# Patient Record
Sex: MALE | Race: WHITE | NOT HISPANIC OR LATINO | Employment: OTHER | URBAN - METROPOLITAN AREA
[De-identification: names, ages, dates, MRNs, and addresses within clinical notes are randomized per-mention and may not be internally consistent; named-entity substitution may affect disease eponyms.]

---

## 2020-12-15 PROBLEM — J12.82 PNEUMONIA DUE TO COVID-19 VIRUS: Status: ACTIVE | Noted: 2020-12-15

## 2020-12-15 PROBLEM — U07.1 PNEUMONIA DUE TO COVID-19 VIRUS: Status: ACTIVE | Noted: 2020-12-15

## 2020-12-15 PROBLEM — J96.01 ACUTE RESPIRATORY FAILURE WITH HYPOXIA: Status: ACTIVE | Noted: 2020-12-15

## 2020-12-16 PROBLEM — N17.9 AKI (ACUTE KIDNEY INJURY): Status: ACTIVE | Noted: 2020-12-16

## 2020-12-21 PROBLEM — D62 ACUTE BLOOD LOSS ANEMIA: Status: ACTIVE | Noted: 2020-12-21

## 2021-01-13 PROBLEM — N17.9 AKI (ACUTE KIDNEY INJURY): Status: RESOLVED | Noted: 2020-12-16 | Resolved: 2021-01-13

## 2021-01-13 PROBLEM — J12.82 PNEUMONIA DUE TO COVID-19 VIRUS: Status: RESOLVED | Noted: 2020-12-15 | Resolved: 2021-01-13

## 2021-01-13 PROBLEM — J96.01 ACUTE RESPIRATORY FAILURE WITH HYPOXIA: Status: RESOLVED | Noted: 2020-12-15 | Resolved: 2021-01-13

## 2021-01-13 PROBLEM — U07.1 PNEUMONIA DUE TO COVID-19 VIRUS: Status: RESOLVED | Noted: 2020-12-15 | Resolved: 2021-01-13

## 2021-01-13 PROBLEM — D62 ACUTE BLOOD LOSS ANEMIA: Status: RESOLVED | Noted: 2020-12-21 | Resolved: 2021-01-13

## 2024-01-07 PROBLEM — R79.89 ELEVATED BRAIN NATRIURETIC PEPTIDE (BNP) LEVEL: Status: ACTIVE | Noted: 2024-01-07

## 2024-01-07 PROBLEM — E87.29 HIGH ANION GAP METABOLIC ACIDOSIS: Status: ACTIVE | Noted: 2024-01-07

## 2024-01-07 PROBLEM — Z71.89 ACP (ADVANCE CARE PLANNING): Status: ACTIVE | Noted: 2024-01-07

## 2024-01-07 PROBLEM — A41.9 SEVERE SEPSIS: Status: ACTIVE | Noted: 2024-01-07

## 2024-01-07 PROBLEM — R65.20 SEVERE SEPSIS: Status: ACTIVE | Noted: 2024-01-07

## 2024-01-07 PROBLEM — J18.9 BILATERAL PNEUMONIA: Status: ACTIVE | Noted: 2024-01-07

## 2024-01-10 PROBLEM — R78.81 BACTEREMIA: Status: ACTIVE | Noted: 2024-01-10

## 2024-01-10 PROBLEM — E87.29 HIGH ANION GAP METABOLIC ACIDOSIS: Status: RESOLVED | Noted: 2024-01-07 | Resolved: 2024-01-10

## 2024-01-10 PROBLEM — I50.41 ACUTE COMBINED SYSTOLIC AND DIASTOLIC CONGESTIVE HEART FAILURE: Status: ACTIVE | Noted: 2024-01-07

## 2024-01-11 PROBLEM — N41.0 ACUTE PROSTATITIS: Status: ACTIVE | Noted: 2024-01-11

## 2024-01-11 PROBLEM — N13.9 ACUTE BILATERAL OBSTRUCTIVE UROPATHY: Status: ACTIVE | Noted: 2024-01-11

## 2024-01-16 PROBLEM — N17.0 SEPSIS WITH ACUTE RENAL FAILURE, TUBULAR NECROSIS, AND SEPTIC SHOCK: Status: ACTIVE | Noted: 2024-01-16

## 2024-01-16 PROBLEM — A41.9 SEPSIS WITH ACUTE RENAL FAILURE, TUBULAR NECROSIS, AND SEPTIC SHOCK: Status: ACTIVE | Noted: 2024-01-16

## 2024-01-16 PROBLEM — R65.21 SEPSIS WITH ACUTE RENAL FAILURE, TUBULAR NECROSIS, AND SEPTIC SHOCK: Status: ACTIVE | Noted: 2024-01-16

## 2024-02-08 ENCOUNTER — TELEPHONE (OUTPATIENT)
Dept: INFECTIOUS DISEASES | Facility: CLINIC | Age: 79
End: 2024-02-08

## 2024-02-08 ENCOUNTER — OFFICE VISIT (OUTPATIENT)
Dept: INFECTIOUS DISEASES | Facility: CLINIC | Age: 79
End: 2024-02-08
Payer: MEDICARE

## 2024-02-08 VITALS
HEIGHT: 72 IN | DIASTOLIC BLOOD PRESSURE: 85 MMHG | BODY MASS INDEX: 26.25 KG/M2 | SYSTOLIC BLOOD PRESSURE: 144 MMHG | HEART RATE: 90 BPM | WEIGHT: 193.81 LBS

## 2024-02-08 DIAGNOSIS — N18.30 STAGE 3 CHRONIC KIDNEY DISEASE, UNSPECIFIED WHETHER STAGE 3A OR 3B CKD: ICD-10-CM

## 2024-02-08 DIAGNOSIS — N41.0 PROSTATITIS, ACUTE: Primary | ICD-10-CM

## 2024-02-08 DIAGNOSIS — Z79.2 RECEIVING INTRAVENOUS ANTIBIOTIC TREATMENT AS OUTPATIENT: ICD-10-CM

## 2024-02-08 DIAGNOSIS — R78.81 BACTEREMIA: ICD-10-CM

## 2024-02-08 PROCEDURE — 99215 OFFICE O/P EST HI 40 MIN: CPT | Mod: S$PBB,,, | Performed by: PHYSICIAN ASSISTANT

## 2024-02-08 PROCEDURE — 99213 OFFICE O/P EST LOW 20 MIN: CPT | Mod: PBBFAC,PO | Performed by: PHYSICIAN ASSISTANT

## 2024-02-08 PROCEDURE — 99999 PR PBB SHADOW E&M-EST. PATIENT-LVL III: CPT | Mod: PBBFAC,,, | Performed by: PHYSICIAN ASSISTANT

## 2024-02-08 NOTE — TELEPHONE ENCOUNTER
Per Alena Mccauley PA-C  - increase IV Cefepime 2 g every 24 hours to Cefepime 2 g every 12 hours  maintain PICC line  EOC 2/21/24   HH to pull PICC and DC labs after last dose  F/U PRN  Called [facility], spoke with Barry, gave above orders.  Called [facility], left message on on vm of Nurse Lesli BUTLER Of above orders, also sent SC

## 2024-02-08 NOTE — PROGRESS NOTES
[facility]  Infectious Disease        Patient ID: Osmin Hernández is a 78 y.o. male.    Chief Complaint: Prostatitis      Osmin was seen today for prostatitis.    Diagnoses and all orders for this visit:    Prostatitis, acute    Bacteremia    Receiving intravenous antibiotic treatment as outpatient    Stage 3 chronic kidney disease, unspecified whether stage 3a or 3b CKD         Greater than 45 minutes was spent on this encounter, which included: review of recent encounters, review and interpretation of labs/images, obtaining pertinent history, performing a physical examination, counseling and educating the patient/family/caregiver, ordering medications/tests, documenting in the electronic health record, and coordinating care with necessary providers.    Interval HPI:   2/8 - ID clinic f/u. Patient here with his wife. He has several concerns/complaints today regarding his medical care in general. Particularly about his blood pressure medication since hospital discharge. I discussed with him and politely deferred his questions to the appropriate specialists/PCP. He has an appt with Cardiology tomorrow. In regards to his infection, he seems to be doing well. Denies fevers, chills, night sweats. Had harris removed and is now on flomax and denies dysuria, incomplete voiding sensation, decreased UOP, etc. Compliant with IV Cefepime and tolerating without issue (wife does his infusions).    Assessment and Plan:     # Sepsis secondary to Citrobacter bacteremia due to prostatitis  - Prev Urine culture Oct 2023: Citrobacter  - 1/7: Bcx:  Citrobacter   - 1/7: Urine culture:  Multiple organisms.  - 1/8: Bcx:  Negative  - discharged home on Cefepime IV to continue for 4-6 weeks for prostatitis  (6 week EOC 2/21/24)     # Urinary retention due to BPH and prostatitis  - No previous history of prostate issues   - CT renal stone study:  Prostatomegaly with acute cystitis.  - PSA: 15.8 (prev 1.4  four months ago)  - Urology planning further workup as outpatient     # Prolonged QTC  - not good candidate for FQ    # CKD     Recommendations:  - labs reviewed and CBC is normal. CMP with improved kidney function  - followed with Urology and passed voiding trial and had harris removed and is continuing on Flomax with no urinary complaints, per patient  - will complete full prostatitis course  - Continue Cefepime IV, but dose needs to be adjusted to 2g IV q12h to reflect current CrCl 33 based on last labs  - Length of therapy: 6 weeks for prostatitis (6 week EOC 2/21/24)  - continue weekly labs  - after last dose, Home Health can pull PICC line and d/c labs/PICC care  - he should f/u with his Urologist as instructed. Also if he develops new urinary symptoms  - f/u with ID as needed      Majority of this encounter was spent discussing various healthcare concerns and issues with his overall medical experience that were unrelated to his current infection for which we are treating. Questions and concerns were addressed to the best of my ability within my scope, and patient seemed satisfied with our discussion by the end of the encounter.       Alena Mccauley PA-C  Infectious Diseases  [facility]           HPI:      This is an 78-year-old man with previous medical history of CKD will came to the hospital on 01/07/2024 complaining of fevers chills and fatigue.  Patient states that onset of symptoms started 1 day prior to admission.    Denies any other issues.    During initial evaluation in the ED patient was found to be afebrile, tachycardic, hypotensive.  Multiple labs were grossly abnormal, leukocytosis with elevated lactic acidosis.    Patient was found to have urinary retention and Harris was placed.    Blood cultures were obtained were positive for Citrobacter.    Patient was started on ceftriaxone infectious Disease was consulted    Past Medical History:   Diagnosis Date    a A H/O  Medical Noncompliance #####    He Stops Meds And Changes Doses W/O Asking Me, And Refuses Vaccine And His AA U/S Study    a H/O Brief Postoperative Arrhythmia In 10/2013     Dr. Tommie Edwards Evaluated And Reportedly Is Resolved    b Hypertension     b Hypertension With White Coat Component     b Stage 3 CKD ###    b White Coat Syndrome     c Hypercholesterolemia     1/2/19 Changed Livalo To 1 Mg qHS; He Was On Livalo 2 Mg qHS, But He Was Only Taking It Every Other Night !    g Chronic Mild Microcytic Anemia ###    9/27/17 Ordered Lab W/U And FOBT But He Refused To Do This; He Refuses To Ever Have A Screening TC    i H/O COVID-19 Infection     [facility] 12/15/20-12/26/20 Stay For This    j H/O Blood Loss Anemia ###    1/13/21 He Refused My Recommended GI Consult; [facility] 12/15/20-12/26/20 Stay For COVID-19 Infection Included This: He Refused GI Consult Or Further W/U While There; He Received 1 Unit PRBCs While There    j H/O Hematochezia ###    1/13/21 He Refused My Recommended GI Consult; [facility] 12/15/20-12/26/20 Stay For COVID-19 Infection Included This: He Refused GI Consult Or Further W/U While There     j H/O Right Inguinal Hernia Repair In 10/2013     Dr. Randal nielson Nocturia     Markedly Improved With Flomax    l Gouty Arthropathy ###    On Allopurinol 300 Mg Daily    q Borderline Vitamin D Deficiency     1/6/19 RXd OTC D3 2K IU Daily    q Right Forearm Dermatitis     Dr. Tristin Daley's 10/12/16 Punch BX = Perivascular Dermatitis With Eosinophils, And Reactive Epithelial Changes    Sepsis, unspecified organism     Admitted [facility] 1/07/2024 - 1/12/2024    Wellness Visit 8/31/2023        Past Surgical History:   Procedure Laterality Date    ADENOIDECTOMY      APPENDECTOMY      CATARACT EXTRACTION      COLON SURGERY      1 foot colon removal after strangulated right inguinal hernia repair    HERNIA REPAIR      TONSILLECTOMY         Family History   Problem Relation Age of Onset    Heart failure Mother         CHF        Social History     Socioeconomic History    Marital status:    Tobacco Use    Smoking status: Former     Passive exposure: Past    Smokeless tobacco: Never   Substance and Sexual Activity    Alcohol use: No    Drug use: Never     Social Determinants of Health     Financial Resource Strain: Low Risk  (1/9/2024)    Overall Financial Resource Strain (CARDIA)     Difficulty of Paying Living Expenses: Not hard at all   Food Insecurity: No Food Insecurity (1/9/2024)    Hunger Vital Sign     Worried About Running Out of Food in the Last Year: Never true     Ran Out of Food in the Last Year: Never true   Transportation Needs: No Transportation Needs (1/13/2024)    OASIS : Transportation     Lack of Transportation (Medical): No     Lack of Transportation (Non-Medical): No     Patient Unable or Declines to Respond: No   Stress: Patient Declined (1/9/2024)    [employer] [place] [employer] - Occupational Stress Questionnaire     Feeling of Stress : Patient declined   Social Connections: Feeling Socially Integrated (1/13/2024)    OASIS : Social Isolation     Frequency of experiencing loneliness or isolation: Never   Housing Stability: Low Risk  (1/9/2024)    Housing Stability Vital Sign     Unable to Pay for Housing in the Last Year: No     Number of Places Lived in the Last Year: 1     Unstable Housing in the Last Year: No       Review of patient's allergies indicates:   Allergen Reactions    Amlodipine      Bilateral leg rash    Crestor [rosuvastatin]      Muscle cramps    Livalo [pitavastatin] Other (See Comments)     Muscle cramps and nightmares     Pravastatin      Other reaction(s): Myalgias       Current Outpatient Medications   Medication Instructions    allopurinoL (ZYLOPRIM) 300 mg, Oral, Daily PRN    ascorbic acid (vitamin C) (VITAMIN C) 500 mg, Oral, 2 times daily    aspirin 81 mg, Oral, Daily    cholecalciferol (vitamin D3) (VITAMIN D3) 2,000 Units, Oral, Daily, Patient takes 5,000  units    clopidogreL (PLAVIX) 75 mg, Oral, Daily    colchicine (COLCRYS) 0.6 mg, Oral, 2 times daily PRN    dextrose 5 % in water (D5W) PgBk 100 mL with ceFEPIme 2 gram SolR 2 g 2 g, Intravenous, Daily    heparin sod,porcine/0.9 % NaCl (HEPARIN FLUSH IV) 50 Units, Intra-Catheter, Daily, Flush PICC with 50 units/5ml of Heparin following Saline flush and Medication administration per [facility] protocol    metoprolol succinate (TOPROL-XL) 25 mg, Oral, Daily    sodium chloride 0.9% (NORMAL SALINE FLUSH) injection 10 mLs, Intra-Catheter, 2 times daily PRN, Flush PICC with 10ml of NS before and after prescribed Cefepime each day per [facility] protocol    tamsulosin (FLOMAX) 0.4 mg, Oral, Daily    UNABLE TO FIND Compound Drug antibiotic for 6 weeks          Review of Systems   Constitutional:  Negative for activity change, appetite change, chills, fatigue and fever.   HENT:  Negative for congestion, mouth sores, sinus pain and sore throat.    Eyes:  Negative for photophobia and visual disturbance.   Respiratory:  Negative for cough, chest tightness, shortness of breath and wheezing.    Cardiovascular:  Negative for chest pain, palpitations and leg swelling.   Gastrointestinal:  Negative for abdominal pain, diarrhea, nausea and vomiting.   Genitourinary:  Negative for dysuria, flank pain, hematuria and urgency.   Musculoskeletal:  Negative for arthralgias, myalgias, neck pain and neck stiffness.   Skin:  Negative for color change and rash.   Neurological:  Negative for dizziness, seizures and headaches.   Psychiatric/Behavioral:  Negative for confusion.            Objective:     Vitals:    02/08/24 0911   BP: (!) 144/85   Pulse: 90              Physical Exam  Vitals and nursing note reviewed.   Constitutional:       General: He is awake. He is not in acute distress.     Appearance: He is well-developed and well-groomed. He is not diaphoretic.   HENT:      Head: Normocephalic and atraumatic.      Right Ear: External ear normal.       Left Ear: External ear normal.      Nose: Nose normal.   Eyes:      General: No scleral icterus.        Right eye: No discharge.         Left eye: No discharge.      Pupils: Pupils are equal, round, and reactive to light.   Cardiovascular:      Rate and Rhythm: Normal rate and regular rhythm.   Pulmonary:      Effort: Pulmonary effort is normal. No accessory muscle usage or respiratory distress.   Abdominal:      General: There is no distension.   Musculoskeletal:      Cervical back: Normal range of motion and neck supple.   Skin:     General: Skin is warm and dry.   Neurological:      General: No focal deficit present.      Mental Status: He is alert and oriented to person, place, and time.   Psychiatric:         Mood and Affect: Mood normal.         Behavior: Behavior normal.           Estimated Creatinine Clearance: 31.9 mL/min (A) (based on SCr of 2.03 mg/dL (H)).      Microbiology Results (last 7 days)       ** No results found for the last 168 hours. **              Significant Labs: All pertinent labs within the past 24 hours have been reviewed.     Significant Imaging: I have reviewed all relevant and available imaging results/findings within the past 24 hours.      Plan -- see top of note

## 2024-03-13 ENCOUNTER — TELEPHONE (OUTPATIENT)
Dept: NEPHROLOGY | Facility: CLINIC | Age: 79
End: 2024-03-13
Payer: MEDICARE

## 2024-03-13 NOTE — TELEPHONE ENCOUNTER
----- Message from Lizette Rodriguez RN sent at 3/13/2024 11:33 AM CDT -----  Regarding: New Patient Appt  Hi,    I am attempting to get this pt an appt w/ Dr. Hou to establish care. Patient is currently seeing Dr. Hwang (with his next appt on 3/25/24) and wishes to change providers. Would someone be able to assist me with getting this pt an farhat w/ Dr. Hou within the next 2 weeks? Please advise.    Thank you for your time,  Lizette Rodriguez, MELLISSA  Post Acute Navigation

## 2024-03-20 ENCOUNTER — OFFICE VISIT (OUTPATIENT)
Dept: NEPHROLOGY | Facility: CLINIC | Age: 79
End: 2024-03-20
Payer: MEDICARE

## 2024-03-20 VITALS
HEIGHT: 72 IN | OXYGEN SATURATION: 95 % | HEART RATE: 82 BPM | DIASTOLIC BLOOD PRESSURE: 80 MMHG | BODY MASS INDEX: 26.28 KG/M2 | SYSTOLIC BLOOD PRESSURE: 140 MMHG

## 2024-03-20 DIAGNOSIS — I10 ESSENTIAL HYPERTENSION: ICD-10-CM

## 2024-03-20 DIAGNOSIS — N40.0 BENIGN PROSTATIC HYPERPLASIA, UNSPECIFIED WHETHER LOWER URINARY TRACT SYMPTOMS PRESENT: ICD-10-CM

## 2024-03-20 DIAGNOSIS — M10.9 GOUTY ARTHROPATHY: ICD-10-CM

## 2024-03-20 DIAGNOSIS — I50.42 CHRONIC COMBINED SYSTOLIC AND DIASTOLIC CONGESTIVE HEART FAILURE: ICD-10-CM

## 2024-03-20 DIAGNOSIS — N18.32 STAGE 3B CHRONIC KIDNEY DISEASE: Primary | ICD-10-CM

## 2024-03-20 PROCEDURE — 99205 OFFICE O/P NEW HI 60 MIN: CPT | Mod: S$PBB,,, | Performed by: INTERNAL MEDICINE

## 2024-03-20 PROCEDURE — 99999 PR PBB SHADOW E&M-EST. PATIENT-LVL III: CPT | Mod: PBBFAC,,, | Performed by: INTERNAL MEDICINE

## 2024-03-20 PROCEDURE — 99213 OFFICE O/P EST LOW 20 MIN: CPT | Mod: PBBFAC,PN | Performed by: INTERNAL MEDICINE

## 2024-03-20 NOTE — PROGRESS NOTES
Subjective:       Patient ID: Osmin Hernández is a 78 y.o. White male who presents for new patient evaluation for chronic renal failure.    Osmin Hernández is referred by Erick Moran MD to be evaluated for chronic renal failure.      78-year-old man with heart failure with reduced ejection fraction, CKD stage 3, HTN, and BPH presents for a second opinion.    Patient was hospitalized in January 2024 due to septic shock from a urinary source. On admission he had JAX due to obstruction, peak Cr 3.7, down to 2.2 at the time of discharge. He also had a NSTEMI, peak troponin 16. Due to JAX cardiology elected to manage medically at the time and the patient was discharged on aspirin and plavix. He has since followed up with cardiology with plans for a stress test tomorrow.    Per patient's wife he never had consistent medical care until he turned 68. He previously had JAX when he was hospitalized with hypoxemic respiratory failure due to COVID in 2020. Since then his baseline Cr ~2. He does report of occasional obstructive symptoms improved with flomax. Denies NSAID use.    Review of Systems   Constitutional:  Negative for chills and fever.   HENT:  Negative for congestion.    Respiratory:  Negative for cough and shortness of breath.    Cardiovascular:  Negative for chest pain and leg swelling.   Gastrointestinal:  Negative for abdominal pain, diarrhea, nausea and vomiting.   Genitourinary:  Negative for difficulty urinating, dysuria and hematuria.   Musculoskeletal:  Negative for myalgias.   Neurological:  Negative for headaches.   Hematological:  Does not bruise/bleed easily.       The past medical, family and social histories were reviewed for this encounter.     Past Medical History:   Diagnosis Date    a A H/O Medical Noncompliance #####    He Stops Meds And Changes Doses W/O Asking Me, And Refuses Vaccine And His AA U/S Study    a H/O Brief Postoperative Arrhythmia In 10/2013     Dr. Tommie Edwards  Evaluated And Reportedly Is Resolved    b Hypertension     b Hypertension With White Coat Component     b Stage 3 CKD ###    b White Coat Syndrome     c Hypercholesterolemia     1/2/19 Changed Livalo To 1 Mg qHS; He Was On Livalo 2 Mg qHS, But He Was Only Taking It Every Other Night !    g Chronic Mild Microcytic Anemia ###    9/27/17 Ordered Lab W/U And FOBT But He Refused To Do This; He Refuses To Ever Have A Screening TC    i H/O COVID-19 Infection     [facility] 12/15/20-12/26/20 Stay For This    j H/O Blood Loss Anemia ###    1/13/21 He Refused My Recommended GI Consult; [facility] 12/15/20-12/26/20 Stay For COVID-19 Infection Included This: He Refused GI Consult Or Further W/U While There; He Received 1 Unit PRBCs While There    j H/O Hematochezia ###    1/13/21 He Refused My Recommended GI Consult; [facility] 12/15/20-12/26/20 Stay For COVID-19 Infection Included This: He Refused GI Consult Or Further W/U While There     j H/O Right Inguinal Hernia Repair In 10/2013     Dr. Randal nielson Nocturia     Markedly Improved With Flomax    l Gouty Arthropathy ###    On Allopurinol 300 Mg Daily    q Borderline Vitamin D Deficiency     1/6/19 RXd OTC D3 2K IU Daily    q Right Forearm Dermatitis     Dr. Tristin Daley's 10/12/16 Punch BX = Perivascular Dermatitis With Eosinophils, And Reactive Epithelial Changes    Sepsis, unspecified organism     Admitted [facility] 1/07/2024 - 1/12/2024    Wellness Visit 8/31/2023      Past Surgical History:   Procedure Laterality Date    ADENOIDECTOMY      APPENDECTOMY      CATARACT EXTRACTION      COLON SURGERY      1 foot colon removal after strangulated right inguinal hernia repair    HERNIA REPAIR      TONSILLECTOMY       Social History     Socioeconomic History    Marital status:    Tobacco Use    Smoking status: Former     Passive exposure: Past    Smokeless tobacco: Never   Substance and Sexual Activity    Alcohol use: No    Drug use: Never     Social Determinants of Health      Financial Resource Strain: Low Risk  (1/9/2024)    Overall Financial Resource Strain (CARDIA)     Difficulty of Paying Living Expenses: Not hard at all   Food Insecurity: No Food Insecurity (1/9/2024)    Hunger Vital Sign     Worried About Running Out of Food in the Last Year: Never true     Ran Out of Food in the Last Year: Never true   Transportation Needs: No Transportation Needs (2/21/2024)    OASIS : Transportation     Lack of Transportation (Medical): No     Lack of Transportation (Non-Medical): No     Patient Unable or Declines to Respond: No   Stress: Patient Declined (1/9/2024)    [employer] [place] [employer] - Occupational Stress Questionnaire     Feeling of Stress : Patient declined   Social Connections: Feeling Socially Integrated (2/21/2024)    OASIS : Social Isolation     Frequency of experiencing loneliness or isolation: Never   Housing Stability: Low Risk  (1/9/2024)    Housing Stability Vital Sign     Unable to Pay for Housing in the Last Year: No     Number of Places Lived in the Last Year: 1     Unstable Housing in the Last Year: No     Current Outpatient Medications   Medication Sig    allopurinoL (ZYLOPRIM) 300 MG tablet Take 300 mg by mouth daily as needed (gout). Indications: treatment to prevent acute gout attack    ascorbic acid, vitamin C, (VITAMIN C) 500 MG tablet Take 1 tablet (500 mg total) by mouth 2 (two) times daily.    cholecalciferol, vitamin D3, (VITAMIN D3) 50 mcg (2,000 unit) Tab Take 1 tablet (2,000 Units total) by mouth once daily. Patient takes 5,000 units    clopidogreL (PLAVIX) 75 mg tablet Take 1 tablet (75 mg total) by mouth once daily.    colchicine (COLCRYS) 0.6 mg tablet Take 1 tablet (0.6 mg total) by mouth 2 (two) times daily as needed (for gout attacks).    metoprolol succinate (TOPROL-XL) 25 MG 24 hr tablet Take 1 tablet (25 mg total) by mouth once daily.    tamsulosin (FLOMAX) 0.4 mg Cap Take 1 capsule (0.4 mg total) by mouth once daily.     telmisartan-hydrochlorothiazide (MICARDIS HCT) 80-25 mg per tablet Take 1 tablet by mouth every morning.    aspirin 81 MG Chew Take 1 tablet (81 mg total) by mouth once daily. (Patient not taking: Reported on 3/20/2024)     No current facility-administered medications for this visit.     BP (!) 140/80 (BP Location: Right arm, Patient Position: Sitting, BP Method: Large (Manual))   Pulse 82   Ht 6' (1.829 m)   SpO2 95%   BMI 26.28 kg/m²     Objective:      Physical Exam  Vitals reviewed.   Constitutional:       General: He is not in acute distress.     Appearance: He is well-developed.   HENT:      Head: Normocephalic and atraumatic.   Eyes:      General: No scleral icterus.     Conjunctiva/sclera: Conjunctivae normal.   Neck:      Vascular: No JVD.   Cardiovascular:      Rate and Rhythm: Normal rate and regular rhythm.      Heart sounds: Normal heart sounds. No murmur heard.     No friction rub. No gallop.   Pulmonary:      Effort: Pulmonary effort is normal. No respiratory distress.      Breath sounds: Normal breath sounds. No wheezing or rales.   Abdominal:      General: Bowel sounds are normal. There is no distension.      Palpations: Abdomen is soft.      Tenderness: There is no abdominal tenderness.   Musculoskeletal:      Right lower leg: No edema.      Left lower leg: No edema.   Skin:     General: Skin is warm and dry.      Findings: No rash.   Neurological:      Mental Status: He is alert and oriented to person, place, and time.         Assessment:       1. Stage 3b chronic kidney disease    2. Hypertension    3. Chronic combined systolic and diastolic congestive heart failure    4. Gouty arthropathy    5. Benign prostatic hyperplasia, unspecified whether lower urinary tract symptoms present        Lab Results   Component Value Date    CREATININE 2.01 (H) 02/19/2024    BUN 42 (H) 02/19/2024     02/19/2024    K 4.4 02/19/2024     02/19/2024    CO2 19 (L) 02/19/2024     Lab Results    Component Value Date    CALCIUM 9.8 02/19/2024    PHOS 5.9 (H) 01/12/2024     Lab Results   Component Value Date    HCT 37.7 (L) 02/19/2024     Prot/Creat Ratio, Urine   Date Value Ref Range Status   01/08/2024 1270.9 (H) 15.0 - 68.0 mg/g Final       Plan:   Return to clinic in 3 months.  Labs for next visit include rfp, pth, upcr, uric acid, and cbc.  Baseline creatinine is 2.0-2.1 since 2022.  Renal US (1/2024) c/w medical renal disease and multiple simple cysts. R 14.6 and L 13  Will need UPCR to assess for proteinuria. Previous value from 1/8/24 obtained when patient had JAX.  Continue flomax  Continue allopurinol and check uric acid at next visit  BP at goal  Discussed at length risk of KLEBER as well as benefits from revascularization. Patient to have stress test with cardiology tomorrow.    Gerry score 7, 14% risk of any post-PIC KLEBER, 0.12% risk of post-PIC KLEBER requiring dialysis

## 2024-04-08 PROBLEM — J18.9 BILATERAL PNEUMONIA: Status: RESOLVED | Noted: 2024-01-07 | Resolved: 2024-04-08

## 2024-04-08 PROBLEM — N17.9 AKI (ACUTE KIDNEY INJURY): Status: RESOLVED | Noted: 2020-12-16 | Resolved: 2024-04-08

## 2024-04-15 PROBLEM — A41.9 SEVERE SEPSIS: Status: RESOLVED | Noted: 2024-01-07 | Resolved: 2024-04-15

## 2024-04-15 PROBLEM — R65.20 SEVERE SEPSIS: Status: RESOLVED | Noted: 2024-01-07 | Resolved: 2024-04-15

## 2024-04-22 PROBLEM — N17.0 SEPSIS WITH ACUTE RENAL FAILURE, TUBULAR NECROSIS, AND SEPTIC SHOCK: Status: RESOLVED | Noted: 2024-01-16 | Resolved: 2024-04-22

## 2024-04-22 PROBLEM — A41.9 SEPSIS WITH ACUTE RENAL FAILURE, TUBULAR NECROSIS, AND SEPTIC SHOCK: Status: RESOLVED | Noted: 2024-01-16 | Resolved: 2024-04-22

## 2024-04-22 PROBLEM — R65.21 SEPSIS WITH ACUTE RENAL FAILURE, TUBULAR NECROSIS, AND SEPTIC SHOCK: Status: RESOLVED | Noted: 2024-01-16 | Resolved: 2024-04-22

## 2024-05-27 ENCOUNTER — TELEPHONE (OUTPATIENT)
Dept: NEPHROLOGY | Facility: CLINIC | Age: 79
End: 2024-05-27
Payer: MEDICARE

## 2024-05-27 NOTE — TELEPHONE ENCOUNTER
----- Message from Manuela Shea sent at 5/27/2024  2:52 PM CDT -----  Regarding: Sx clearance  Type:  Needs Medical Advice    Who Called: Pt Wife    Would the patient rather a call back or a response via [facility]? Call back    Best Call Back Number: [phone]    Additional Information: Pt will be having a procedure for his heart soon but needs to see DR before 6/6/2024 to get clearance for his Sx. Thank you

## 2024-05-30 NOTE — TELEPHONE ENCOUNTER
I spoke with Mr Hernández at length this morning after speaking with Dr Hou.     He is going to get lab today.     He will discuss or has already discussed with DR Mckeon:    -risk vs benefit  -hydration before and after  -repeat lab    Dr Nino olvera'd visit for 6.10.24 after his stents.